# Patient Record
Sex: FEMALE | Race: WHITE
[De-identification: names, ages, dates, MRNs, and addresses within clinical notes are randomized per-mention and may not be internally consistent; named-entity substitution may affect disease eponyms.]

---

## 2020-02-03 ENCOUNTER — HOSPITAL ENCOUNTER (EMERGENCY)
Dept: HOSPITAL 26 - MED | Age: 30
Discharge: LEFT BEFORE DISCHARGE | End: 2020-02-03
Payer: COMMERCIAL

## 2020-02-03 VITALS — SYSTOLIC BLOOD PRESSURE: 124 MMHG | DIASTOLIC BLOOD PRESSURE: 92 MMHG

## 2020-02-03 VITALS — WEIGHT: 175 LBS | HEIGHT: 62 IN | BODY MASS INDEX: 32.2 KG/M2

## 2020-02-03 DIAGNOSIS — Z53.21: ICD-10-CM

## 2020-02-03 DIAGNOSIS — R07.89: Primary | ICD-10-CM

## 2021-07-14 ENCOUNTER — HOSPITAL ENCOUNTER (EMERGENCY)
Dept: HOSPITAL 26 - MED | Age: 31
Discharge: LEFT BEFORE DISCHARGE | End: 2021-07-14
Payer: COMMERCIAL

## 2021-07-14 VITALS — DIASTOLIC BLOOD PRESSURE: 81 MMHG | SYSTOLIC BLOOD PRESSURE: 133 MMHG

## 2021-07-14 VITALS — BODY MASS INDEX: 34.78 KG/M2 | WEIGHT: 189 LBS | HEIGHT: 62 IN

## 2021-07-14 DIAGNOSIS — R20.0: Primary | ICD-10-CM

## 2021-07-14 DIAGNOSIS — Z53.21: ICD-10-CM

## 2022-05-16 ENCOUNTER — HOSPITAL ENCOUNTER (EMERGENCY)
Dept: HOSPITAL 26 - MED | Age: 32
Discharge: HOME | End: 2022-05-16
Payer: COMMERCIAL

## 2022-05-16 VITALS — SYSTOLIC BLOOD PRESSURE: 129 MMHG | DIASTOLIC BLOOD PRESSURE: 85 MMHG

## 2022-05-16 VITALS — SYSTOLIC BLOOD PRESSURE: 113 MMHG | DIASTOLIC BLOOD PRESSURE: 68 MMHG

## 2022-05-16 VITALS — WEIGHT: 170 LBS | BODY MASS INDEX: 31.28 KG/M2 | HEIGHT: 62 IN

## 2022-05-16 DIAGNOSIS — Z88.8: ICD-10-CM

## 2022-05-16 DIAGNOSIS — F41.9: ICD-10-CM

## 2022-05-16 DIAGNOSIS — R07.9: Primary | ICD-10-CM

## 2022-05-16 PROCEDURE — 81002 URINALYSIS NONAUTO W/O SCOPE: CPT

## 2022-05-16 PROCEDURE — 71045 X-RAY EXAM CHEST 1 VIEW: CPT

## 2022-05-16 PROCEDURE — 81025 URINE PREGNANCY TEST: CPT

## 2022-05-16 PROCEDURE — 99284 EMERGENCY DEPT VISIT MOD MDM: CPT

## 2022-05-16 PROCEDURE — 93005 ELECTROCARDIOGRAM TRACING: CPT

## 2022-05-16 NOTE — NUR
32/F PRESENTS TO ED WITH C/O 7/10 SHARP CHEST PAIN SINCE THIS MORNING. PATIENT 
REPORTS SHE WAS AT REST WHEN PAIN CAME ON SUDDENLY, PATIENT REPORTS NAUSEA AND 
ONE EPISODE OF VOMITING SINCE THIS MORNING. PATIENT DENIES RECENT INJURY OR 
TRAUMA TO CHEST. DENIES SOB, COUGH OR FEVERS. DENIES TAKING MEDICATION FOR PAIN 
PRIOR TO ARRIVAL TO ED.

## 2022-05-16 NOTE — NUR
Patient discharged with v/s stable. Written and verbal after care instructions 
ABOUT NONSPECIFIC CHEST PAIN AND PANIC ATTACK given and explained. 

Patient alert, oriented and verbalized understanding of instructions. 
Ambulatory with steady gait. All questions addressed prior to discharge. ID 
band removed. Patient advised to follow up with PMD. Rx of ZOFRAN ODT given. 
Patient educated on indication of medication including possible reaction and 
side effects. Opportunity to ask questions provided and answered.

## 2022-11-19 ENCOUNTER — HOSPITAL ENCOUNTER (EMERGENCY)
Dept: HOSPITAL 26 - MED | Age: 32
Discharge: HOME | End: 2022-11-19
Payer: COMMERCIAL

## 2022-11-19 VITALS — HEIGHT: 62 IN | WEIGHT: 170 LBS | BODY MASS INDEX: 31.28 KG/M2

## 2022-11-19 VITALS — DIASTOLIC BLOOD PRESSURE: 69 MMHG | SYSTOLIC BLOOD PRESSURE: 116 MMHG

## 2022-11-19 VITALS — SYSTOLIC BLOOD PRESSURE: 123 MMHG | DIASTOLIC BLOOD PRESSURE: 90 MMHG
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
## 2022-11-19 DIAGNOSIS — S16.1XXA: Primary | ICD-10-CM
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
## 2022-11-19 DIAGNOSIS — G43.909: ICD-10-CM

## 2022-11-19 DIAGNOSIS — Z79.899: ICD-10-CM
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
## 2022-11-19 DIAGNOSIS — Z88.8: ICD-10-CM
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
## 2022-11-19 PROCEDURE — 99284 EMERGENCY DEPT VISIT MOD MDM: CPT

## 2022-11-19 PROCEDURE — 81025 URINE PREGNANCY TEST: CPT

## 2022-11-19 PROCEDURE — 96372 THER/PROPH/DIAG INJ SC/IM: CPT

## 2022-11-19 NOTE — NUR
Patient discharged with v/s stable. Written and verbal after care instructions 
about cervical sprain, migraine headache given and explained. 

Patient alert, oriented and verbalized understanding of instructions. 
Ambulatory with steady gait. All questions addressed prior to discharge. ID 
band removed. Patient advised to follow up with PMD. Rx of LIDOCAINE HYD given. 
Patient educated on indication of medication including possible reaction and 
side effects. Opportunity to ask questions provided and answered.

## 2022-11-19 NOTE — NUR
33YO FEMALE PT C/O HEADACHE AND INCREASED TIGHT NECK PAIN X1WEEK. PT WITH HX OF 
ARTHRITIS AND REPORTS S/S. DENIES RELIEF AFTER RX CIMIZIA OR HYDROCODONE. NOTES 
NAUSEA AND CHILLS X3DAYS. NECK NON TENDER TO TOUCH. FULL ROM WITH SOME 
DISCOMFORT. DENIES RECENT INJURY, N/V/D, DIZZINESS, CHEST PAIN, SOB OR FEVER. 
PT AAOX4, HOB POSITIONED PER COMFORT.



HX: ANKYLOSING SPONDYLITIS, FIBROMYALGIA, ARTHITIS

ALLERGIES:REGLAN

## 2022-11-19 NOTE — NUR
31Y/O FEMALE PRESENTED TO ED WITH C/O NECK PAIN X1WEEK, MIGRAINE [id number]. PT 
REPORTS WORSENING NECK PAIN THIS MORNING, SHARP CONSTANT 5/10 PAIN, SENISITIVY 
TO LIGHT WHEN HEADACHES OCCUR. PT REPORTS TAKING NORCO WITH MILD RELIEF. PT 
DENIES DIZZINESS, TRAUMA/INJURY, N/V.